# Patient Record
Sex: MALE | Race: WHITE | NOT HISPANIC OR LATINO | Employment: FULL TIME | URBAN - METROPOLITAN AREA
[De-identification: names, ages, dates, MRNs, and addresses within clinical notes are randomized per-mention and may not be internally consistent; named-entity substitution may affect disease eponyms.]

---

## 2017-01-04 ENCOUNTER — TELEPHONE (OUTPATIENT)
Dept: CARDIOLOGY | Facility: MEDICAL CENTER | Age: 37
End: 2017-01-04

## 2017-01-04 NOTE — TELEPHONE ENCOUNTER
----- Message from Gayla Abarca sent at 1/4/2017  8:01 AM PST -----  Regarding: Question about upcoming appt with FK 1/9  JAIMIE/Frances,    Patient girlfriend David is calling. States due to bad weather, they wants to know if pt's work clearance letter can be mailed?Instead of coming in on 1/9? She would please like a call back and can be reached at [phone].

## 2017-01-04 NOTE — TELEPHONE ENCOUNTER
----- Message from Gayal Abarca sent at 1/4/2017  8:01 AM PST -----  Regarding: Question about upcoming appt with FK 1/9  JAIMIE/Frances,    Patient girlfriend David is calling. States due to bad weather, they wants to know if pt's work clearance letter can be mailed?Instead of coming in on 1/9? She would please like a call back and can be reached at [phone].

## 2017-01-05 NOTE — TELEPHONE ENCOUNTER
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    Patient will call the office back with fax # for his employer.    RAMIREZ RN    ============================================================================   RE: Clearance Request from Patient  Received: Today       BRIAN Gutierrez R.N.                     OK, stress echo was normal            Previous Messages       ----- Message -----      From: Frances Robb R.N.      Sent: 1/4/2017   8:24 AM        To: Willy Saez M.D.   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 Let me know, thank you!     RAMIREZ BROWNLEE

## 2017-01-06 ENCOUNTER — TELEPHONE (OUTPATIENT)
Dept: CARDIOLOGY | Facility: MEDICAL CENTER | Age: 37
End: 2017-01-06

## 2017-01-06 NOTE — TELEPHONE ENCOUNTER
Letter faxed to requested number. PT called to report.  Jeana GONSALES RN    ----- Message from Bernice Driscoll, Med Ass't sent at 1/5/2017  1:00 PM PST -----  Regarding: Returning call   Patient is calling back to give Fax# [phone] please fax clearance to him.     Thank you

## 2017-01-18 DIAGNOSIS — I25.10 CORONARY ARTERY DISEASE DUE TO CALCIFIED CORONARY LESION: ICD-10-CM

## 2017-01-18 DIAGNOSIS — I25.84 CORONARY ARTERY DISEASE DUE TO CALCIFIED CORONARY LESION: ICD-10-CM

## 2017-01-18 DIAGNOSIS — E78.5 DYSLIPIDEMIA: ICD-10-CM

## 2017-01-19 RX ORDER — EZETIMIBE 10 MG/1
10 TABLET ORAL DAILY
Qty: 30 TAB | Refills: 11 | Status: SHIPPED | OUTPATIENT
Start: 2017-01-19

## 2017-02-06 ENCOUNTER — TELEPHONE (OUTPATIENT)
Dept: CARDIOLOGY | Facility: MEDICAL CENTER | Age: 37
End: 2017-02-06

## 2017-02-06 DIAGNOSIS — Z95.5 STENTED CORONARY ARTERY: ICD-10-CM

## 2017-02-06 DIAGNOSIS — I21.3 ST ELEVATION MYOCARDIAL INFARCTION (STEMI), UNSPECIFIED ARTERY (HCC): ICD-10-CM

## 2017-02-06 DIAGNOSIS — E78.5 DYSLIPIDEMIA: ICD-10-CM

## 2017-02-06 NOTE — TELEPHONE ENCOUNTER
----- Message from Willy Saez M.D. sent at 2/6/2017  9:39 AM PST -----  Needs fu in June with lipid/cmp

## 2017-02-06 NOTE — Clinical Note
February 7, 2017        Ryan Marin  [address] [address]        Dear Ryan:      Your cardiologist Dr. Saez would like to see you in June 2017 for a follow up office visit. He would also like you to have lab work about a week prior to your appointment. Please call our office at [phone] option 1 to be scheduled.     Your lab slips are enclosed. Thank you and have a good day.      Sincerely,            [facility]

## 2017-02-07 NOTE — TELEPHONE ENCOUNTER
Left patient another voicemail with instructions to call the office to schedule a follow up appointment in June.    Mailed patient a letter regarding the same. Lab slips included with instructions.    RAMIREZ BROWNLEE

## 2018-01-16 DIAGNOSIS — I25.10 CORONARY ARTERY DISEASE DUE TO CALCIFIED CORONARY LESION: ICD-10-CM

## 2018-01-16 DIAGNOSIS — E78.5 DYSLIPIDEMIA: ICD-10-CM

## 2018-01-16 DIAGNOSIS — I25.84 CORONARY ARTERY DISEASE DUE TO CALCIFIED CORONARY LESION: ICD-10-CM

## 2018-01-17 RX ORDER — OMEGA-3-ACID ETHYL ESTERS 1 G/1
2 CAPSULE, LIQUID FILLED ORAL 2 TIMES DAILY
Qty: 360 CAP | Refills: 0 | Status: SHIPPED | OUTPATIENT
Start: 2018-01-17

## 2018-02-28 ENCOUNTER — TELEPHONE (OUTPATIENT)
Dept: CARDIOLOGY | Facility: MEDICAL CENTER | Age: 38
End: 2018-02-28

## 2018-02-28 NOTE — TELEPHONE ENCOUNTER
Patient Requesting Lab Order   Received: Today   Message Contents   JASPREET Lozoya Afternoon JAIMIE Palomino patient called to schedule a FV today and wanted to know if he could get a duplicate copy of the Lab order for the tests FK wants him to have done so that he can have those completed at [facility] before his appt on 03/23/18.     Thank you!     Lindsay [phone]      CMP and Lipid profile Lab req mailed to patient.

## 2018-03-23 ENCOUNTER — OFFICE VISIT (OUTPATIENT)
Dept: CARDIOLOGY | Facility: MEDICAL CENTER | Age: 38
End: 2018-03-23
Payer: COMMERCIAL

## 2018-03-23 VITALS
HEART RATE: 88 BPM | SYSTOLIC BLOOD PRESSURE: 124 MMHG | DIASTOLIC BLOOD PRESSURE: 80 MMHG | BODY MASS INDEX: 30.48 KG/M2 | WEIGHT: 230 LBS | HEIGHT: 73 IN

## 2018-03-23 DIAGNOSIS — I25.10 CORONARY ARTERY DISEASE DUE TO CALCIFIED CORONARY LESION: ICD-10-CM

## 2018-03-23 DIAGNOSIS — I25.84 CORONARY ARTERY DISEASE DUE TO CALCIFIED CORONARY LESION: ICD-10-CM

## 2018-03-23 DIAGNOSIS — E78.5 DYSLIPIDEMIA: ICD-10-CM

## 2018-03-23 DIAGNOSIS — R79.89 ELEVATED LFTS: ICD-10-CM

## 2018-03-23 PROCEDURE — 99214 OFFICE O/P EST MOD 30 MIN: CPT | Performed by: INTERNAL MEDICINE

## 2018-03-23 NOTE — LETTER
[facility]   [address]  [place] [address]  Phone: [phone]  Fax: [phone]              Ryan Marin  1980    Encounter Date: 3/23/2018    Willy Saez M.D.          PROGRESS NOTE:  Subjective:   Ryan Marin is a 37 y.o. male who presents today for followup of his coronary artery disease. He suffered an acute myocardial infarction in July 2014. He received a stent to the circumflex.    He works out 3-4 times a week.    He continues have some chest discomfort. This has been occurring every couple of months. It is a substernal discomfort which he describes as a cramping type sensation. It is mild and he rates it about 1/10. It may last anywhere from 15-20 minutes up to an hour. It does not radiate and is not associated with any nausea, vomiting, diaphoresis or shortness of breath. He does related somewhat to anxiety. Is not exertional in nature.    He's had no dyspnea, edema, palpitations or lightheadedness.        Past Medical History:   Diagnosis Date   • CAD (coronary artery disease)    • Heart attack    • HLD (hyperlipidemia) 3/12/2015     Past Surgical History:   Procedure Laterality Date   • CARDIAC CATH  July 22, 2014    100% Circumflex. 4.0x33mm LUBNA placed.     Family History   Problem Relation Age of Onset   • Other Mother      History   Smoking Status   • Former Smoker   • Packs/day: 0.25   • Years: 10.00   • Types: Cigarettes   • Quit date: 6/1/2014   Smokeless Tobacco   • Never Used     Allergies   Allergen Reactions   • Simvastatin Unspecified     jaundice   • Tylenol      Jaundice      Outpatient Encounter Prescriptions as of 3/23/2018   Medication Sig Dispense Refill   • omega-3 acid ethyl esters (LOVAZA) 1 GM capsule Take 2 Caps by mouth 2 Times a Day. OVERDUE 1 YEAR!  PLEASE CALL TO SCHEDULE AN APPOINTMENT!  THANK  Cap 0   • ezetimibe (ZETIA) 10 MG Tab Take 1 Tab by mouth every day. Okay to fill for generic. 30 Tab 11  "  • lisinopril (PRINIVIL) 5 MG Tab Take 1 Tab by mouth every day. 30 Tab 11   • omeprazole (PRILOSEC) 40 MG delayed-release capsule Take 1 Cap by mouth every day. 30 Cap 5   • Coenzyme Q10 (COQ10 PO) Take  by mouth.       No facility-administered encounter medications on file as of 3/23/2018.      ROS     Objective:   /80   Pulse 88   Ht 1.854 m (6' 1\")   Wt 104.3 kg (230 lb)   BMI 30.34 kg/m²       Physical Exam   Neck: No JVD present.   Cardiovascular: Normal rate and regular rhythm.  Exam reveals no gallop.    No murmur heard.  Pulmonary/Chest: Effort normal. He has no rales.   Abdominal: Soft. There is no tenderness.   Musculoskeletal: He exhibits no edema.     Echocardiogram:  CONCLUSIONS  The inferior wall has mild focal hypokinesis, with no other focal wall   motion abnormalities.  Normal left ventricular chamber size.   Normal left ventricular wall thickness.   Left ventricular ejection fraction is 55% to 60%.  The right ventricle was normal in size and function.   The right atrium is normal in size.   The left atrium is normal in size.   Trace mitral regurgitation. Moderately thickened mitral valve leaflets   without stenosis.  Structurally normal aortic valve without significant stenosis or   regurgitation.   Trace tricuspid regurgitation.  Right ventricular systolic pressure is estimated to be 27-32mmHg.  Mild pulmonic insufficiency.  Normal pericardium without effusion.   Final Date: 24 July 2014     Stress echocardiogram:  Echocardiography Laboratory  CONCLUSIONS  Hypertensive blood pressure response to exercise.   Mild concentric hypertrophy of the left ventricle.  Ejection fraction   at rest is 75%.  Negative stress echocardiogram for ischemia with adequate stress   achieved by heart rate criteria.     Exam Date:           12/27/2016       Laboratory from March 22: Total cholesterol 140, HDL 22, LDL 88 and triglycerides 148. LFTs are still elevated with a bilirubin of 2.4,  and ALT " 154.      Assessment:     1. Coronary artery disease due to calcified coronary lesion: LUBNA in 2014     2. Dyslipidemia         Medical Decision Making:  Today's Assessment / Status / Plan:     Mr. Marin is clinically stable. He continues to have some rare atypical chest discomfort but had a normal stress echocardiogram. I don't feel this needs any further evaluation of the present time. Unfortunately, he was unable to tolerate statin therapy because of possible elevation in his liver function tests. However, his LFTs continued to be elevated in spite of not being on statin therapy. He did not follow-up with GI as requested. We will give him another referral. He will follow-up in about 4 months.                                  Physical Exam   Neck: No JVD present.   Cardiovascular: Normal rate and regular rhythm.  Exam reveals no gallop.    No murmur heard.  Pulmonary/Chest: Effort normal. He has no rales.   Abdominal: Soft. There is no tenderness.   Musculoskeletal: He exhibits no edema.           Cortez Ramsey D.O.  [address] [facility] [address]  VIA Facsimile: [phone]

## 2018-03-23 NOTE — PROGRESS NOTES
Subjective:   Ryan Marin is a 37 y.o. male who presents today for followup of his coronary artery disease. He suffered an acute myocardial infarction in July 2014. He received a stent to the circumflex.    He works out 3-4 times a week.    He continues have some chest discomfort. This has been occurring every couple of months. It is a substernal discomfort which he describes as a cramping type sensation. It is mild and he rates it about 1/10. It may last anywhere from 15-20 minutes up to an hour. It does not radiate and is not associated with any nausea, vomiting, diaphoresis or shortness of breath. He does related somewhat to anxiety. Is not exertional in nature.    He's had no dyspnea, edema, palpitations or lightheadedness.        Past Medical History:   Diagnosis Date   • CAD (coronary artery disease)    • Heart attack    • HLD (hyperlipidemia) 3/12/2015     Past Surgical History:   Procedure Laterality Date   • CARDIAC CATH  July 22, 2014    100% Circumflex. 4.0x33mm LUBNA placed.     Family History   Problem Relation Age of Onset   • Other Mother      History   Smoking Status   • Former Smoker   • Packs/day: 0.25   • Years: 10.00   • Types: Cigarettes   • Quit date: 6/1/2014   Smokeless Tobacco   • Never Used     Allergies   Allergen Reactions   • Simvastatin Unspecified     jaundice   • Tylenol      Jaundice      Outpatient Encounter Prescriptions as of 3/23/2018   Medication Sig Dispense Refill   • omega-3 acid ethyl esters (LOVAZA) 1 GM capsule Take 2 Caps by mouth 2 Times a Day. OVERDUE 1 YEAR!  PLEASE CALL TO SCHEDULE AN APPOINTMENT!  THANK  Cap 0   • ezetimibe (ZETIA) 10 MG Tab Take 1 Tab by mouth every day. Okay to fill for generic. 30 Tab 11   • lisinopril (PRINIVIL) 5 MG Tab Take 1 Tab by mouth every day. 30 Tab 11   • omeprazole (PRILOSEC) 40 MG delayed-release capsule Take 1 Cap by mouth every day. 30 Cap 5   • Coenzyme Q10 (COQ10 PO) Take  by mouth.       No facility-administered encounter  "medications on file as of 3/23/2018.      ROS     Objective:   /80   Pulse 88   Ht 1.854 m (6' 1\")   Wt 104.3 kg (230 lb)   BMI 30.34 kg/m²      Physical Exam   Neck: No JVD present.   Cardiovascular: Normal rate and regular rhythm.  Exam reveals no gallop.    No murmur heard.  Pulmonary/Chest: Effort normal. He has no rales.   Abdominal: Soft. There is no tenderness.   Musculoskeletal: He exhibits no edema.     Echocardiogram:  CONCLUSIONS  The inferior wall has mild focal hypokinesis, with no other focal wall   motion abnormalities.  Normal left ventricular chamber size.   Normal left ventricular wall thickness.   Left ventricular ejection fraction is 55% to 60%.  The right ventricle was normal in size and function.   The right atrium is normal in size.   The left atrium is normal in size.   Trace mitral regurgitation. Moderately thickened mitral valve leaflets   without stenosis.  Structurally normal aortic valve without significant stenosis or   regurgitation.   Trace tricuspid regurgitation.  Right ventricular systolic pressure is estimated to be 27-32mmHg.  Mild pulmonic insufficiency.  Normal pericardium without effusion.   Final Date: 24 July 2014     Stress echocardiogram:  Echocardiography Laboratory  CONCLUSIONS  Hypertensive blood pressure response to exercise.   Mild concentric hypertrophy of the left ventricle.  Ejection fraction   at rest is 75%.  Negative stress echocardiogram for ischemia with adequate stress   achieved by heart rate criteria.     Exam Date:           12/27/2016       Laboratory from March 22: Total cholesterol 140, HDL 22, LDL 88 and triglycerides 148. LFTs are still elevated with a bilirubin of 2.4,  and .      Assessment:     1. Coronary artery disease due to calcified coronary lesion: LUBNA in 2014     2. Dyslipidemia         Medical Decision Making:  Today's Assessment / Status / Plan:     Mr. Marin is clinically stable. He continues to have some rare " atypical chest discomfort but had a normal stress echocardiogram. I don't feel this needs any further evaluation of the present time. Unfortunately, he was unable to tolerate statin therapy because of possible elevation in his liver function tests. However, his LFTs continued to be elevated in spite of not being on statin therapy. He did not follow-up with GI as requested. We will give him another referral. He will follow-up in about 4 months.                                  Physical Exam   Neck: No JVD present.   Cardiovascular: Normal rate and regular rhythm.  Exam reveals no gallop.    No murmur heard.  Pulmonary/Chest: Effort normal. He has no rales.   Abdominal: Soft. There is no tenderness.   Musculoskeletal: He exhibits no edema.